# Patient Record
Sex: MALE | Race: WHITE | NOT HISPANIC OR LATINO
[De-identification: names, ages, dates, MRNs, and addresses within clinical notes are randomized per-mention and may not be internally consistent; named-entity substitution may affect disease eponyms.]

---

## 2018-09-20 ENCOUNTER — RECORD ABSTRACTING (OUTPATIENT)
Age: 58
End: 2018-09-20

## 2018-09-26 ENCOUNTER — NON-APPOINTMENT (OUTPATIENT)
Age: 58
End: 2018-09-26

## 2018-09-26 ENCOUNTER — APPOINTMENT (OUTPATIENT)
Dept: CARDIOLOGY | Facility: CLINIC | Age: 58
End: 2018-09-26
Payer: COMMERCIAL

## 2018-09-26 VITALS
HEIGHT: 74 IN | SYSTOLIC BLOOD PRESSURE: 120 MMHG | HEART RATE: 56 BPM | DIASTOLIC BLOOD PRESSURE: 72 MMHG | OXYGEN SATURATION: 99 % | BODY MASS INDEX: 21.3 KG/M2 | WEIGHT: 166 LBS

## 2018-09-26 DIAGNOSIS — F19.90 OTHER PSYCHOACTIVE SUBSTANCE USE, UNSPECIFIED, UNCOMPLICATED: ICD-10-CM

## 2018-09-26 DIAGNOSIS — Z87.891 PERSONAL HISTORY OF NICOTINE DEPENDENCE: ICD-10-CM

## 2018-09-26 DIAGNOSIS — Z86.79 PERSONAL HISTORY OF OTHER DISEASES OF THE CIRCULATORY SYSTEM: ICD-10-CM

## 2018-09-26 PROCEDURE — 99204 OFFICE O/P NEW MOD 45 MIN: CPT

## 2018-09-26 PROCEDURE — 93000 ELECTROCARDIOGRAM COMPLETE: CPT

## 2018-09-26 RX ORDER — LISINOPRIL 40 MG/1
40 TABLET ORAL DAILY
Refills: 0 | Status: ACTIVE | COMMUNITY

## 2018-10-08 ENCOUNTER — MEDICATION RENEWAL (OUTPATIENT)
Age: 58
End: 2018-10-08

## 2018-10-24 ENCOUNTER — APPOINTMENT (OUTPATIENT)
Dept: CARDIOLOGY | Facility: CLINIC | Age: 58
End: 2018-10-24
Payer: COMMERCIAL

## 2018-10-24 PROCEDURE — 93306 TTE W/DOPPLER COMPLETE: CPT

## 2018-10-24 PROCEDURE — 93880 EXTRACRANIAL BILAT STUDY: CPT

## 2018-10-31 ENCOUNTER — APPOINTMENT (OUTPATIENT)
Dept: CARDIOLOGY | Facility: CLINIC | Age: 58
End: 2018-10-31
Payer: COMMERCIAL

## 2018-10-31 VITALS
HEIGHT: 74 IN | BODY MASS INDEX: 21.82 KG/M2 | WEIGHT: 170 LBS | DIASTOLIC BLOOD PRESSURE: 88 MMHG | SYSTOLIC BLOOD PRESSURE: 158 MMHG

## 2018-10-31 PROCEDURE — 99214 OFFICE O/P EST MOD 30 MIN: CPT

## 2018-10-31 RX ORDER — CLONIDINE HYDROCHLORIDE 0.2 MG/1
0.2 TABLET ORAL DAILY
Refills: 0 | Status: DISCONTINUED | COMMUNITY
End: 2018-10-31

## 2018-11-30 ENCOUNTER — RX RENEWAL (OUTPATIENT)
Age: 58
End: 2018-11-30

## 2018-12-04 ENCOUNTER — APPOINTMENT (OUTPATIENT)
Dept: CARDIOLOGY | Facility: CLINIC | Age: 58
End: 2018-12-04
Payer: COMMERCIAL

## 2018-12-04 DIAGNOSIS — Z82.49 FAMILY HISTORY OF ISCHEMIC HEART DISEASE AND OTHER DISEASES OF THE CIRCULATORY SYSTEM: ICD-10-CM

## 2018-12-04 PROCEDURE — A9502: CPT

## 2018-12-04 PROCEDURE — 93015 CV STRESS TEST SUPVJ I&R: CPT

## 2018-12-04 PROCEDURE — 78452 HT MUSCLE IMAGE SPECT MULT: CPT

## 2018-12-05 ENCOUNTER — APPOINTMENT (OUTPATIENT)
Dept: CARDIOLOGY | Facility: CLINIC | Age: 58
End: 2018-12-05
Payer: COMMERCIAL

## 2018-12-05 VITALS
DIASTOLIC BLOOD PRESSURE: 78 MMHG | HEART RATE: 62 BPM | WEIGHT: 170 LBS | HEIGHT: 74 IN | BODY MASS INDEX: 21.82 KG/M2 | SYSTOLIC BLOOD PRESSURE: 128 MMHG

## 2018-12-05 PROCEDURE — 99214 OFFICE O/P EST MOD 30 MIN: CPT

## 2018-12-05 RX ORDER — PROPRANOLOL HYDROCHLORIDE 40 MG/1
40 TABLET ORAL TWICE DAILY
Refills: 0 | Status: DISCONTINUED | COMMUNITY
End: 2018-12-05

## 2018-12-05 RX ORDER — CLONIDINE HYDROCHLORIDE 0.1 MG/1
0.1 TABLET ORAL DAILY
Qty: 30 | Refills: 0 | Status: DISCONTINUED | COMMUNITY
Start: 2018-10-31 | End: 2018-12-05

## 2018-12-05 NOTE — REASON FOR VISIT
[Follow-Up - Clinic] : a clinic follow-up of [Abnormal ECG] : an abnormal ECG [Abnormal Test Result] : an abnormal test result [Hyperlipidemia] : hyperlipidemia [Hypertension] : hypertension [Medication Management] : Medication management [Mitral Regurgitation] : mitral regurgitation

## 2018-12-09 NOTE — PHYSICAL EXAM
[General Appearance - Well Developed] : well developed [Normal Appearance] : normal appearance [Well Groomed] : well groomed [General Appearance - Well Nourished] : well nourished [No Deformities] : no deformities [General Appearance - In No Acute Distress] : no acute distress [Normal Conjunctiva] : the conjunctiva exhibited no abnormalities [Eyelids - No Xanthelasma] : the eyelids demonstrated no xanthelasmas [Normal Oral Mucosa] : normal oral mucosa [No Oral Pallor] : no oral pallor [No Oral Cyanosis] : no oral cyanosis [Normal Jugular Venous A Waves Present] : normal jugular venous A waves present [Normal Jugular Venous V Waves Present] : normal jugular venous V waves present [No Jugular Venous Alvarez A Waves] : no jugular venous alvarez A waves [Respiration, Rhythm And Depth] : normal respiratory rhythm and effort [Exaggerated Use Of Accessory Muscles For Inspiration] : no accessory muscle use [Auscultation Breath Sounds / Voice Sounds] : lungs were clear to auscultation bilaterally [Heart Rate And Rhythm] : heart rate and rhythm were normal [Heart Sounds] : normal S1 and S2 [Murmurs] : no murmurs present [Abdomen Soft] : soft [Abdomen Tenderness] : non-tender [Abdomen Mass (___ Cm)] : no abdominal mass palpated [Abnormal Walk] : normal gait [Gait - Sufficient For Exercise Testing] : the gait was sufficient for exercise testing [Nail Clubbing] : no clubbing of the fingernails [Cyanosis, Localized] : no localized cyanosis [Petechial Hemorrhages (___cm)] : no petechial hemorrhages [Skin Color & Pigmentation] : normal skin color and pigmentation [] : no rash [No Venous Stasis] : no venous stasis [Skin Lesions] : no skin lesions [No Skin Ulcers] : no skin ulcer [No Xanthoma] : no  xanthoma was observed [Oriented To Time, Place, And Person] : oriented to person, place, and time [Affect] : the affect was normal [Mood] : the mood was normal [No Anxiety] : not feeling anxious [FreeTextEntry1] : decr upstroke

## 2018-12-09 NOTE — HISTORY OF PRESENT ILLNESS
[FreeTextEntry1] : JAMES FENG  is a 58 year old  M\par \par  from [place]. \par \par Long-standing history of hypertension. Reactive component. Hypertension for years. Feels his blood pressure go up in the doctor's office and then feels weak afterwards.\par Past history of heavy tobacco use. Quit several weeks ago. \par Recently told of elevated cholesterol. \par Also scheduled to see vascular. He intermittently reports discoloration of his feet\par \par At primary physician's office, noted to have abnormal normal EKG. \par EKG demonstrates sinus bradycardia with anteroseptal MI of indeterminate age. \par \par There is no prior history of a clinical myocardial infarction, coronary revascularization. \par There is no history of symptomatic congestive heart failure rheumatic heart disease or valvular disease.\par There is no history of symptomatic arrhythmias including atrial fibrillation.\par \par There is no exertional chest pain, pressure or discomfort. \par There is no significant dyspnea on exertion or orthopnea. \par There are no symptomatic palpitations, lightheadedness, dizziness or syncope.\par \par In the office. There are multiple elevated blood pressure readings. Home blood pressure monitor is accurate. Home readings have been reviewed and home readings are low. \par \par Blood work, September 2018. Hemoglobin 14.2, potassium 4.3, creatinine 1.1, .\par Carotid Mild plaque. No stenosis. \par Echocardiogram. Mild mitral regurgitation, normal left ventricular function segmental wall motion abnl\par Nuclear stress test. Exercised 3 minutes and 30 seconds. Fixed defect with concomitant attenuation. No ischemia.\par

## 2018-12-09 NOTE — ASSESSMENT
[FreeTextEntry1] : Abnormal EKG\par Abnormal echo, segmental wall motion abnormality.\par Stress test has been reviewed. No significant ischemic heart disease. \par No symptoms. \par Mild valvular disease. \par Atherosclerosis. \par Hyperlipidemia. \par Continue ASA and statin rx \par Smoking cessation\par \par Reactive HTN\par Discussed the diagnosis, natural history and pathophysiology of HTN.\par Discussed importance of long term BP control to reduce CV risk\par Reviewed recent guidelines and BP goals\par Echocardiogram without hypertensive heart disease\par Discussed indications for pharmacotherapy. \par Blood pressure within normal limits on today's examination\par Now off propranolol and clonidine. \par May be able to further reduce antihypertensives.\par Monitor blood pressure at home. \par \par Instructed to call if adverse effects\par Outside blood work and vascular evaluation has been requested.\par Adjunctive dietary measures: regular aerobic exercise, low-sodium diet, limit NSAIDs. \par Followup will be scheduled in 6 months.\par

## 2018-12-10 ENCOUNTER — RX RENEWAL (OUTPATIENT)
Age: 58
End: 2018-12-10

## 2018-12-10 RX ORDER — ASPIRIN 81 MG/1
81 TABLET, CHEWABLE ORAL
Qty: 90 | Refills: 3 | Status: ACTIVE | COMMUNITY
Start: 2018-12-10 | End: 1900-01-01

## 2019-06-05 ENCOUNTER — APPOINTMENT (OUTPATIENT)
Dept: CARDIOLOGY | Facility: CLINIC | Age: 59
End: 2019-06-05

## 2019-07-24 ENCOUNTER — APPOINTMENT (OUTPATIENT)
Dept: CARDIOLOGY | Facility: CLINIC | Age: 59
End: 2019-07-24
Payer: COMMERCIAL

## 2019-07-24 VITALS
HEIGHT: 74 IN | WEIGHT: 180 LBS | DIASTOLIC BLOOD PRESSURE: 68 MMHG | SYSTOLIC BLOOD PRESSURE: 128 MMHG | HEART RATE: 60 BPM | BODY MASS INDEX: 23.1 KG/M2

## 2019-07-24 DIAGNOSIS — Z00.00 ENCOUNTER FOR GENERAL ADULT MEDICAL EXAMINATION W/OUT ABNORMAL FINDINGS: ICD-10-CM

## 2019-07-24 PROCEDURE — 99214 OFFICE O/P EST MOD 30 MIN: CPT

## 2019-07-24 RX ORDER — KRILL/OM-3/DHA/EPA/PHOSPHO/AST 1000-230MG
81 CAPSULE ORAL DAILY
Qty: 90 | Refills: 0 | Status: DISCONTINUED | COMMUNITY
Start: 2018-09-26 | End: 2019-07-24

## 2019-07-24 NOTE — REASON FOR VISIT
[Follow-Up - Clinic] : a clinic follow-up of [Abnormal ECG] : an abnormal ECG [Hypertension] : hypertension [Hyperlipidemia] : hyperlipidemia [Abnormal Test Result] : an abnormal test result [Medication Management] : Medication management [Mitral Regurgitation] : mitral regurgitation

## 2019-07-25 NOTE — HISTORY OF PRESENT ILLNESS
[FreeTextEntry1] : JAMES FENG  is a 58 year old  M\par \par  from [place]. \par \par Long-standing history of hypertension. Reactive component. Hypertension for years. Feels his blood pressure go up in the doctor's office and then feels weak afterwards.\par Past history of heavy tobacco use. Quit recently \par Recently told of elevated cholesterol. \par Also scheduled to see vascular. He intermittently reports discoloration of his feet\par \par At primary physician's office, noted to have abnormal normal EKG. \par EKG demonstrates sinus bradycardia with anteroseptal MI of indeterminate age. \par \par There is no prior history of a clinical myocardial infarction, coronary revascularization. \par There is no history of symptomatic congestive heart failure rheumatic heart disease or valvular disease.\par There is no history of symptomatic arrhythmias including atrial fibrillation.\par \par There is no exertional chest pain, pressure or discomfort. \par There is no significant dyspnea on exertion or orthopnea. \par There are no symptomatic palpitations, lightheadedness, dizziness or syncope.\par \par In the office. There are multiple elevated blood pressure readings. Home blood pressure monitor is accurate. Home readings have been reviewed and home readings are low. \par \par Quit tobacco use\par Never saw vascular \par \par Intermittent bluish discoloration of his leg with swelling.\par Blood work, September 2018. Hemoglobin 14.2, potassium 4.3, creatinine 1.1, .\par Carotid Mild plaque. No stenosis. \par Echocardiogram. Mild mitral regurgitation, normal left ventricular function segmental wall motion abnl\par Nuclear stress test. Exercised 3 minutes and 30 seconds. Fixed defect with concomitant attenuation. No ischemia.\par

## 2019-07-25 NOTE — PHYSICAL EXAM
[Normal Appearance] : normal appearance [Well Groomed] : well groomed [General Appearance - Well Developed] : well developed [General Appearance - Well Nourished] : well nourished [No Deformities] : no deformities [General Appearance - In No Acute Distress] : no acute distress [Eyelids - No Xanthelasma] : the eyelids demonstrated no xanthelasmas [Normal Oral Mucosa] : normal oral mucosa [Normal Conjunctiva] : the conjunctiva exhibited no abnormalities [No Oral Cyanosis] : no oral cyanosis [No Oral Pallor] : no oral pallor [No Jugular Venous Alvarez A Waves] : no jugular venous alvarez A waves [Normal Jugular Venous V Waves Present] : normal jugular venous V waves present [Normal Jugular Venous A Waves Present] : normal jugular venous A waves present [Respiration, Rhythm And Depth] : normal respiratory rhythm and effort [Heart Rate And Rhythm] : heart rate and rhythm were normal [Auscultation Breath Sounds / Voice Sounds] : lungs were clear to auscultation bilaterally [Exaggerated Use Of Accessory Muscles For Inspiration] : no accessory muscle use [Murmurs] : no murmurs present [Heart Sounds] : normal S1 and S2 [Abdomen Soft] : soft [Abdomen Tenderness] : non-tender [Abnormal Walk] : normal gait [Gait - Sufficient For Exercise Testing] : the gait was sufficient for exercise testing [Abdomen Mass (___ Cm)] : no abdominal mass palpated [Petechial Hemorrhages (___cm)] : no petechial hemorrhages [Cyanosis, Localized] : no localized cyanosis [Nail Clubbing] : no clubbing of the fingernails [] : no ischemic changes [Skin Color & Pigmentation] : normal skin color and pigmentation [No Venous Stasis] : no venous stasis [No Skin Ulcers] : no skin ulcer [Skin Lesions] : no skin lesions [No Xanthoma] : no  xanthoma was observed [Affect] : the affect was normal [Oriented To Time, Place, And Person] : oriented to person, place, and time [No Anxiety] : not feeling anxious [Mood] : the mood was normal [FreeTextEntry1] : decr upstroke

## 2019-07-25 NOTE — ASSESSMENT
[FreeTextEntry1] : Abnormal EKG\par Abnormal echo, segmental wall motion abnormality.\par Stress test has been reviewed. No significant ischemic heart disease. \par No symptoms. \par Mild valvular disease. \par Atherosclerosis. \par Hyperlipidemia. \par \par Followup bloodwork has been requested\par Recent noninvasive testing has been reviewed \par Arrange vascular consultation \par Continue aspirin\par Continue statin\par Monitor blood pressure at home.\par Smoking cessation\par \par Reactive HTN\par Discussed the diagnosis, natural history and pathophysiology of HTN.\par Discussed importance of long term BP control to reduce CV risk\par Reviewed recent guidelines and BP goals\par Echocardiogram without hypertensive heart disease\par Discussed indications for pharmacotherapy. \par Now off propranolol and clonidine. \par \par Instructed to call if adverse effects\par Adjunctive dietary measures: regular aerobic exercise, low-sodium diet, limit NSAIDs. \par Followup will be scheduled in 6 months.\par

## 2020-01-06 ENCOUNTER — RECORD ABSTRACTING (OUTPATIENT)
Age: 60
End: 2020-01-06

## 2020-03-02 ENCOUNTER — NON-APPOINTMENT (OUTPATIENT)
Age: 60
End: 2020-03-02

## 2020-03-02 ENCOUNTER — APPOINTMENT (OUTPATIENT)
Dept: CARDIOLOGY | Facility: CLINIC | Age: 60
End: 2020-03-02
Payer: MEDICAID

## 2020-03-02 VITALS
OXYGEN SATURATION: 96 % | WEIGHT: 182 LBS | BODY MASS INDEX: 23.36 KG/M2 | HEIGHT: 74 IN | HEART RATE: 88 BPM | DIASTOLIC BLOOD PRESSURE: 72 MMHG | SYSTOLIC BLOOD PRESSURE: 110 MMHG

## 2020-03-02 PROCEDURE — 93000 ELECTROCARDIOGRAM COMPLETE: CPT

## 2020-03-02 PROCEDURE — 99214 OFFICE O/P EST MOD 30 MIN: CPT

## 2020-03-02 RX ORDER — ROSUVASTATIN CALCIUM 10 MG/1
10 TABLET, FILM COATED ORAL DAILY
Qty: 90 | Refills: 1 | Status: ACTIVE | COMMUNITY
Start: 2020-03-02 | End: 1900-01-01

## 2020-03-02 RX ORDER — ATORVASTATIN CALCIUM 20 MG/1
20 TABLET, FILM COATED ORAL
Refills: 0 | Status: DISCONTINUED | COMMUNITY
End: 2020-03-02

## 2020-03-06 NOTE — ASSESSMENT
[FreeTextEntry1] : \par Abnormal EKG\par Abnormal echo, segmental wall motion abnormality.\par Stress test has been reviewed. \par No symptoms. \par Mild valvular disease. \par Atherosclerosis. \par Hyperlipidemia. Lipitor intolerance.  \par \par Start Crestor.  Instructed to call if adverse effects.  \par Vascular follow-up.  \par Blood pressure now well controlled.  \par Invasive evaluation if any symptoms\par Followup bloodwork has been requested\par \par Continue aspirin\par Continue statin\par Monitor blood pressure at home.\par Smoking cessation\par \par Reactive HTN\par Discussed the diagnosis, natural history and pathophysiology of HTN.\par Discussed importance of long term BP control to reduce CV risk\par Reviewed recent guidelines and BP goals\par Echocardiogram without hypertensive heart disease\par Discussed indications for pharmacotherapy. \par Now off propranolol and clonidine. \par \par Instructed to call if adverse effects\par Adjunctive dietary measures: regular aerobic exercise, low-sodium diet, limit NSAIDs. \par

## 2020-03-06 NOTE — PHYSICAL EXAM
[General Appearance - Well Developed] : well developed [Normal Appearance] : normal appearance [Well Groomed] : well groomed [No Deformities] : no deformities [General Appearance - Well Nourished] : well nourished [General Appearance - In No Acute Distress] : no acute distress [Normal Conjunctiva] : the conjunctiva exhibited no abnormalities [Eyelids - No Xanthelasma] : the eyelids demonstrated no xanthelasmas [Normal Oral Mucosa] : normal oral mucosa [No Oral Pallor] : no oral pallor [No Oral Cyanosis] : no oral cyanosis [Normal Jugular Venous A Waves Present] : normal jugular venous A waves present [Normal Jugular Venous V Waves Present] : normal jugular venous V waves present [No Jugular Venous Alvarez A Waves] : no jugular venous alvarez A waves [Respiration, Rhythm And Depth] : normal respiratory rhythm and effort [Exaggerated Use Of Accessory Muscles For Inspiration] : no accessory muscle use [Heart Sounds] : normal S1 and S2 [Auscultation Breath Sounds / Voice Sounds] : lungs were clear to auscultation bilaterally [Heart Rate And Rhythm] : heart rate and rhythm were normal [Abdomen Soft] : soft [Murmurs] : no murmurs present [Abdomen Tenderness] : non-tender [Abdomen Mass (___ Cm)] : no abdominal mass palpated [Abnormal Walk] : normal gait [Gait - Sufficient For Exercise Testing] : the gait was sufficient for exercise testing [Nail Clubbing] : no clubbing of the fingernails [Cyanosis, Localized] : no localized cyanosis [Petechial Hemorrhages (___cm)] : no petechial hemorrhages [Skin Color & Pigmentation] : normal skin color and pigmentation [] : no rash [No Venous Stasis] : no venous stasis [Skin Lesions] : no skin lesions [No Skin Ulcers] : no skin ulcer [No Xanthoma] : no  xanthoma was observed [Oriented To Time, Place, And Person] : oriented to person, place, and time [Affect] : the affect was normal [Mood] : the mood was normal [No Anxiety] : not feeling anxious [FreeTextEntry1] : decr upstroke

## 2020-03-06 NOTE — HISTORY OF PRESENT ILLNESS
[FreeTextEntry1] : JAMES FENG  is a 59 year old  M\par  from [place]. \par \par Long-standing history of hypertension. Reactive component. Hypertension for years. Feels his blood pressure go up in the doctor's office and then feels weak afterwards.\par Past history of heavy tobacco use. Quit recently \par Recently told of elevated cholesterol. \par Also scheduled to see vascular. He intermittently reports discoloration of his feet\par \par At primary physician's office, noted to have abnormal EKG. \par EKG demonstrates sinus bradycardia with anteroseptal MI of indeterminate age. \par \par There is no prior history of a clinical myocardial infarction, coronary revascularization. \par There is no history of symptomatic congestive heart failure rheumatic heart disease or valvular disease.\par There is no history of symptomatic arrhythmias including atrial fibrillation.\par \par There is no exertional chest pain, pressure or discomfort. \par There is no significant dyspnea on exertion or orthopnea. \par There are no symptomatic palpitations, lightheadedness, dizziness or syncope.\par \par In the office. There are multiple elevated blood pressure readings. Home blood pressure monitor is accurate. Home readings have been reviewed and home readings are low. \par \par Quit tobacco use\par Never saw vascular \par Intermittent bluish discoloration of his leg with swelling.\par Went off Lipitor due to symptoms of GI upset.  \par \par Blood work February 2020 TSH total cholesterol 248, creatinine 1.3, hemoglobin 14.6, .  \par Blood work, September 2018. Hemoglobin 14.2, potassium 4.3, creatinine 1.1, .\par Carotid 10.18 Mild plaque. No stenosis. \par Echocardiogram 10.18. Mild mitral regurgitation, normal left ventricular function segmental wall motion abnl\par Nuclear stress test 12.18. Exercised 3 minutes and 30 seconds. Fixed defect with concomitant attenuation. No ischemia.\par

## 2020-08-11 ENCOUNTER — APPOINTMENT (OUTPATIENT)
Dept: CARDIOLOGY | Facility: CLINIC | Age: 60
End: 2020-08-11
Payer: MEDICAID

## 2020-08-11 VITALS
OXYGEN SATURATION: 98 % | WEIGHT: 182 LBS | HEART RATE: 72 BPM | SYSTOLIC BLOOD PRESSURE: 138 MMHG | DIASTOLIC BLOOD PRESSURE: 80 MMHG | HEIGHT: 74 IN | BODY MASS INDEX: 23.36 KG/M2 | TEMPERATURE: 97.3 F

## 2020-08-11 DIAGNOSIS — R93.1 ABNORMAL FINDINGS ON DIAGNOSTIC IMAGING OF HEART AND CORONARY CIRCULATION: ICD-10-CM

## 2020-08-11 PROCEDURE — 99214 OFFICE O/P EST MOD 30 MIN: CPT

## 2020-08-11 RX ORDER — CHLORTHALIDONE 25 MG/1
25 TABLET ORAL DAILY
Refills: 0 | Status: DISCONTINUED | COMMUNITY
End: 2020-08-11

## 2020-08-11 NOTE — HISTORY OF PRESENT ILLNESS
[FreeTextEntry1] : JAMES FENG  is a 59 year old  M\par  from [place]. \par \par Long-standing history of hypertension. Reactive component. Hypertension for years. Feels his blood pressure go up in the doctor's office and then feels weak afterwards.\par Past history of heavy tobacco use. Quit recently \par Recently told of elevated cholesterol. \par Also scheduled to see vascular. He intermittently reports discoloration of his feet\par \par At primary physician's office, noted to have abnormal EKG. \par EKG demonstrates sinus bradycardia with anteroseptal MI of indeterminate age. \par \par There is no prior history of a clinical myocardial infarction, coronary revascularization. \par There is no history of symptomatic congestive heart failure rheumatic heart disease or valvular disease.\par There is no history of symptomatic arrhythmias including atrial fibrillation.\par \par There is no exertional chest pain, pressure or discomfort. \par There is no significant dyspnea on exertion or orthopnea. \par There are no symptomatic palpitations, lightheadedness, dizziness or syncope.\par \par In the office. There are multiple elevated blood pressure readings. Home blood pressure monitor is inaccurate. \par Quit tobacco use\par Never saw vascular \par Intermittent bluish discoloration of his leg with swelling.\par Went off Lipitor due to symptoms of GI upset.  \par \par Blood work February 2020 TSH total cholesterol 248, creatinine 1.3, hemoglobin 14.6, .  \par Blood work, September 2018. Hemoglobin 14.2, potassium 4.3, creatinine 1.1, .\par Carotid 10.18 Mild plaque. No stenosis. \par Echocardiogram 10.18. Mild mitral regurgitation, normal left ventricular function segmental wall motion abnl\par Nuclear stress test 12.18. Exercised 3 minutes and 30 seconds. Fixed defect with concomitant attenuation. No ischemia.\par

## 2020-08-11 NOTE — REASON FOR VISIT
[Abnormal ECG] : an abnormal ECG [Follow-Up - Clinic] : a clinic follow-up of [Abnormal Test Result] : an abnormal test result [Hyperlipidemia] : hyperlipidemia [Hypertension] : hypertension [Medication Management] : Medication management [Mitral Regurgitation] : mitral regurgitation

## 2020-08-11 NOTE — PHYSICAL EXAM
[General Appearance - Well Developed] : well developed [Normal Appearance] : normal appearance [Well Groomed] : well groomed [General Appearance - Well Nourished] : well nourished [No Deformities] : no deformities [Normal Conjunctiva] : the conjunctiva exhibited no abnormalities [General Appearance - In No Acute Distress] : no acute distress [Eyelids - No Xanthelasma] : the eyelids demonstrated no xanthelasmas [Normal Oral Mucosa] : normal oral mucosa [No Oral Pallor] : no oral pallor [No Oral Cyanosis] : no oral cyanosis [Normal Jugular Venous A Waves Present] : normal jugular venous A waves present [Normal Jugular Venous V Waves Present] : normal jugular venous V waves present [No Jugular Venous Alvarez A Waves] : no jugular venous alvarez A waves [Exaggerated Use Of Accessory Muscles For Inspiration] : no accessory muscle use [Respiration, Rhythm And Depth] : normal respiratory rhythm and effort [Heart Rate And Rhythm] : heart rate and rhythm were normal [Heart Sounds] : normal S1 and S2 [Auscultation Breath Sounds / Voice Sounds] : lungs were clear to auscultation bilaterally [Abdomen Soft] : soft [Murmurs] : no murmurs present [Abdomen Tenderness] : non-tender [Abdomen Mass (___ Cm)] : no abdominal mass palpated [Gait - Sufficient For Exercise Testing] : the gait was sufficient for exercise testing [Abnormal Walk] : normal gait [Cyanosis, Localized] : no localized cyanosis [Nail Clubbing] : no clubbing of the fingernails [Petechial Hemorrhages (___cm)] : no petechial hemorrhages [Skin Color & Pigmentation] : normal skin color and pigmentation [No Venous Stasis] : no venous stasis [] : no rash [No Skin Ulcers] : no skin ulcer [Skin Lesions] : no skin lesions [No Xanthoma] : no  xanthoma was observed [Oriented To Time, Place, And Person] : oriented to person, place, and time [Affect] : the affect was normal [Mood] : the mood was normal [No Anxiety] : not feeling anxious [FreeTextEntry1] : decr upstroke

## 2020-08-11 NOTE — ASSESSMENT
[FreeTextEntry1] : \par Abnormal EKG\par Abnormal echo, segmental wall motion abnormality.\par Stress test has been reviewed. \par No symptoms. \par Mild valvular disease. \par Atherosclerosis. \par Hyperlipidemia. Lipitor intolerance.  \par \par \par Reactive HTN\par Discussed the diagnosis, natural history and pathophysiology of HTN.\par Discussed importance of long term BP control to reduce CV risk\par Reviewed recent guidelines and BP goals\par Recently had a couple of episodes of lightheadedness.  His blood pressure was low.  The dose of chlorthalidone was reduced to half by PMD.  He has been feeling better.  Today's blood pressure reading is fine.  He does have white coat syndrome and is usually slightly elevated in doctor's office.  Continue blood pressure monitoring.  Increase hydration.  His home blood pressure machine is inaccurate.  He will get a new blood pressure cuff and continue monitoring blood pressure at home.

## 2020-09-14 ENCOUNTER — APPOINTMENT (OUTPATIENT)
Dept: CARDIOLOGY | Facility: CLINIC | Age: 60
End: 2020-09-14
Payer: MEDICAID

## 2020-09-14 VITALS
DIASTOLIC BLOOD PRESSURE: 80 MMHG | SYSTOLIC BLOOD PRESSURE: 140 MMHG | HEART RATE: 67 BPM | BODY MASS INDEX: 23.61 KG/M2 | OXYGEN SATURATION: 98 % | HEIGHT: 74 IN | WEIGHT: 184 LBS

## 2020-09-14 DIAGNOSIS — R60.0 LOCALIZED EDEMA: ICD-10-CM

## 2020-09-14 DIAGNOSIS — R42 DIZZINESS AND GIDDINESS: ICD-10-CM

## 2020-09-14 PROCEDURE — 99214 OFFICE O/P EST MOD 30 MIN: CPT

## 2020-09-14 PROCEDURE — 93880 EXTRACRANIAL BILAT STUDY: CPT

## 2020-09-14 NOTE — REASON FOR VISIT
[Follow-Up - Clinic] : a clinic follow-up of [Abnormal ECG] : an abnormal ECG [Abnormal Test Result] : an abnormal test result [Hypertension] : hypertension [Hyperlipidemia] : hyperlipidemia [Medication Management] : Medication management [Mitral Regurgitation] : mitral regurgitation

## 2020-09-24 ENCOUNTER — APPOINTMENT (OUTPATIENT)
Dept: CARDIOLOGY | Facility: CLINIC | Age: 60
End: 2020-09-24
Payer: MEDICAID

## 2020-09-24 PROCEDURE — 93880 EXTRACRANIAL BILAT STUDY: CPT

## 2020-09-24 PROCEDURE — 93306 TTE W/DOPPLER COMPLETE: CPT

## 2020-09-26 NOTE — HISTORY OF PRESENT ILLNESS
[FreeTextEntry1] : JAMES FENG  is a 59 year old  M\par  from [place]. \par \par Long-standing history of hypertension. Reactive component. Hypertension for years. Feels his blood pressure go up in the doctor's office and then feels weak afterwards.\par Past history of heavy tobacco use. Quit recently \par Recently told of elevated cholesterol. \par Also scheduled to see vascular. He intermittently reports discoloration of his feet\par \par At primary physician's office, noted to have abnormal EKG. \par EKG demonstrates sinus bradycardia with anteroseptal MI of indeterminate age. \par \par There is no prior history of a clinical myocardial infarction, coronary revascularization. \par There is no history of symptomatic congestive heart failure rheumatic heart disease or valvular disease.\par There is no history of symptomatic arrhythmias including atrial fibrillation.\par \par There is no exertional chest pain, pressure or discomfort. \par There is no significant dyspnea on exertion or orthopnea. \par There are no symptomatic palpitations, lightheadedness, dizziness or syncope.\par \par In the office. There are multiple elevated blood pressure readings. Home blood pressure monitor is accurate. Home readings have been reviewed and home readings are low. \par \par Quit tobacco use\par Intermittent bluish discoloration of his leg with swelling.\par Went off Lipitor due to symptoms of GI upset.  \par \par Had dizziness with low blood pressure readings.  Dose of chlorthalidone was reduced.  \par He has edema related to venous insufficiency. \par Following with vascular.  \par On today's examination his blood pressure is well controlled.  His home monitor is accurate. \par On repeat measurement his blood pressure is 126/80. \par \par July 2020 hemoglobin 14.0, creatinine 1.2,  \par Blood work February 2020 TSH total cholesterol 248, creatinine 1.3, hemoglobin 14.6, .  \par Carotid 10.18 Mild plaque. No stenosis. \par Echocardiogram 10.18. Mild mitral regurgitation, normal left ventricular function segmental wall motion abnl\par Nuclear stress test 12.18. Exercised 3 minutes and 30 seconds. Fixed defect with concomitant attenuation. No ischemia.\par

## 2020-09-26 NOTE — ASSESSMENT
[FreeTextEntry1] : Abnormal EKG\par Abnormal echo, segmental wall motion abnormality.\par Stress test has been reviewed. \par \par Edema.  Dizziness.  \par Follow-up carotid Doppler and echocardiogram.\par \par \par Mild valvular disease. \par Atherosclerosis. \par Hyperlipidemia. Lipitor intolerance.  \par \par Continue Crestor.  Instructed to call if adverse effects.  \par Vascular follow-up.  \par Blood pressure now well controlled. \par If recurrent symptoms instructed to take a seated position and increase hydration\par Will continue low-dose of chlorthalidone.   \par \par Continue aspirin\par Continue statin\par Monitor blood pressure at home.\par Smoking cessation\par \par Reactive HTN\par Discussed the diagnosis, natural history and pathophysiology of HTN.\par Discussed importance of long term BP control to reduce CV risk\par Reviewed recent guidelines and BP goals\par Echocardiogram without hypertensive heart disease\par Discussed indications for pharmacotherapy. \par Now off propranolol and clonidine. \par \par Instructed to call if adverse effects\par Adjunctive dietary measures: regular aerobic exercise, low-sodium diet, limit NSAIDs. \par

## 2020-09-26 NOTE — PHYSICAL EXAM
[Normal Appearance] : normal appearance [General Appearance - Well Developed] : well developed [Well Groomed] : well groomed [General Appearance - Well Nourished] : well nourished [General Appearance - In No Acute Distress] : no acute distress [No Deformities] : no deformities [Normal Conjunctiva] : the conjunctiva exhibited no abnormalities [Eyelids - No Xanthelasma] : the eyelids demonstrated no xanthelasmas [Normal Oral Mucosa] : normal oral mucosa [No Oral Pallor] : no oral pallor [No Oral Cyanosis] : no oral cyanosis [Normal Jugular Venous A Waves Present] : normal jugular venous A waves present [Normal Jugular Venous V Waves Present] : normal jugular venous V waves present [No Jugular Venous Alvarez A Waves] : no jugular venous alvarez A waves [Respiration, Rhythm And Depth] : normal respiratory rhythm and effort [Exaggerated Use Of Accessory Muscles For Inspiration] : no accessory muscle use [Auscultation Breath Sounds / Voice Sounds] : lungs were clear to auscultation bilaterally [Heart Rate And Rhythm] : heart rate and rhythm were normal [Heart Sounds] : normal S1 and S2 [Murmurs] : no murmurs present [Abdomen Soft] : soft [Abdomen Tenderness] : non-tender [Abdomen Mass (___ Cm)] : no abdominal mass palpated [Gait - Sufficient For Exercise Testing] : the gait was sufficient for exercise testing [Abnormal Walk] : normal gait [Cyanosis, Localized] : no localized cyanosis [Nail Clubbing] : no clubbing of the fingernails [Petechial Hemorrhages (___cm)] : no petechial hemorrhages [Skin Color & Pigmentation] : normal skin color and pigmentation [] : no rash [No Venous Stasis] : no venous stasis [Skin Lesions] : no skin lesions [No Skin Ulcers] : no skin ulcer [No Xanthoma] : no  xanthoma was observed [Oriented To Time, Place, And Person] : oriented to person, place, and time [Affect] : the affect was normal [Mood] : the mood was normal [No Anxiety] : not feeling anxious [FreeTextEntry1] : decr upstroke

## 2020-10-27 ENCOUNTER — APPOINTMENT (OUTPATIENT)
Dept: CARDIOLOGY | Facility: CLINIC | Age: 60
End: 2020-10-27
Payer: MEDICAID

## 2020-10-27 VITALS
TEMPERATURE: 97.5 F | SYSTOLIC BLOOD PRESSURE: 142 MMHG | HEART RATE: 76 BPM | DIASTOLIC BLOOD PRESSURE: 92 MMHG | OXYGEN SATURATION: 98 % | BODY MASS INDEX: 23.75 KG/M2 | WEIGHT: 185 LBS

## 2020-10-27 DIAGNOSIS — R94.31 ABNORMAL ELECTROCARDIOGRAM [ECG] [EKG]: ICD-10-CM

## 2020-10-27 DIAGNOSIS — I34.0 NONRHEUMATIC MITRAL (VALVE) INSUFFICIENCY: ICD-10-CM

## 2020-10-27 DIAGNOSIS — E78.5 HYPERLIPIDEMIA, UNSPECIFIED: ICD-10-CM

## 2020-10-27 DIAGNOSIS — I65.29 OCCLUSION AND STENOSIS OF UNSPECIFIED CAROTID ARTERY: ICD-10-CM

## 2020-10-27 DIAGNOSIS — I10 ESSENTIAL (PRIMARY) HYPERTENSION: ICD-10-CM

## 2020-10-27 PROCEDURE — 99072 ADDL SUPL MATRL&STAF TM PHE: CPT

## 2020-10-27 PROCEDURE — 99214 OFFICE O/P EST MOD 30 MIN: CPT

## 2020-10-27 NOTE — ADDENDUM
[FreeTextEntry1] : Please note the patient was reviewed with ZURI Marti.\par I was physically present during the service of the patient\par I was directly involved in the management plan and recommendations of care provided to the patient. \par I personally reviewed the history and physical exam and examination as documented by the PA above.\par 10/27/2020\par

## 2020-10-27 NOTE — ASSESSMENT
[FreeTextEntry1] : JAMES FENG  is a 59 year M  who presents today Oct 27, 2020 with the above history and the following active issues. \par \par Prior history of edema which has resolved. \par \par Echocardiogram reviewed and demonstrates mild valvular disease. \par Atherosclerosis. \par \par Hyperlipidemia. Lipitor intolerance.  Continue Crestor.  Instructed to call if adverse effects.  \par Vascular follow-up.  Lifestyle and risk factor modification\par Continue aspirin\par Continue statin\par Monitor blood pressure at home.\par \par Reactive HTN\par Discussed the diagnosis, natural history and pathophysiology of HTN.\par Discussed importance of long term BP control to reduce CV risk\par Reviewed recent guidelines and BP goals\par Echocardiogram without hypertensive heart disease\par Discussed indications for pharmacotherapy. \par Now off propranolol and clonidine. \par \par Instructed to call if adverse effects\par Adjunctive dietary measures: regular aerobic exercise, low-sodium diet, limit NSAIDs. \par \par Red flag symptoms which would warrant sooner emergent evaluation reviewed with the patient. \par Questions and concerns were addressed and answered. \par \par Sincerely,\par \par Suzan Marti PA-C\par Patients history, testing and plan reviewed with supervising MD: Dr. Pantera Barajas \par

## 2020-10-27 NOTE — HISTORY OF PRESENT ILLNESS
[FreeTextEntry1] : JAMES FENG  is a 59 year M  who presents today Oct 27, 2020 to review echo and carotid US. Abd US was denied by insurance. Overall he has been doing very well. Asymptomatic from arrhythmia and cardiovascular standpoint. Medications remain unchanged. Exerting himself on a regular basis with no new exertional complaints. \par  from [place]. \par \par Long-standing history of hypertension. Reactive component. Hypertension for years. Feels his blood pressure go up in the doctor's office and then feels weak afterwards.\par Past history of heavy tobacco use. Quit recently \par \par At primary physician's office, noted to have abnormal EKG. \par EKG demonstrates sinus bradycardia with anteroseptal MI of indeterminate age. \par \par There is no prior history of a clinical myocardial infarction, coronary revascularization. \par There is no history of symptomatic congestive heart failure rheumatic heart disease or valvular disease.\par There is no history of symptomatic arrhythmias including atrial fibrillation.\par \par There is no exertional chest pain, pressure or discomfort. \par There is no significant dyspnea on exertion or orthopnea. \par There are no symptomatic palpitations, lightheadedness, dizziness or syncope.\par \par In the office. There are multiple elevated blood pressure readings. Home blood pressure monitor is accurate. \par \par \par Echo 9/24/2020 EF 60-65%, mild MR, min AR, no significant changes 2018\par \par Carotid US 09/24/2020 mild non-obstructive disease\par \par July 2020 hemoglobin 14.0, creatinine 1.2,  \par \par Blood work February 2020 TSH total cholesterol 248, creatinine 1.3, hemoglobin 14.6, .  \par Carotid 10.18 Mild plaque. No stenosis. \par \par Echocardiogram 10.18. Mild mitral regurgitation, normal left ventricular function segmental wall motion abnl\par Nuclear stress test 12.18. Exercised 3 minutes and 30 seconds. Fixed defect with concomitant attenuation. No ischemia.\par

## 2021-04-19 ENCOUNTER — APPOINTMENT (OUTPATIENT)
Dept: CARDIOLOGY | Facility: CLINIC | Age: 61
End: 2021-04-19

## 2021-04-19 NOTE — HISTORY OF PRESENT ILLNESS
[FreeTextEntry1] : JAMES FENG  is a 59 year M  who presents today Oct 27, 2020 to review echo and carotid US. Abd US was denied by insurance. Overall he has been doing very well. Asymptomatic from arrhythmia and cardiovascular standpoint. Medications remain unchanged. Exerting himself on a regular basis with no new exertional complaints. \par  from [place]. \par \par Long-standing history of hypertension. Reactive component. Hypertension for years. Feels his blood pressure go up in the doctor's office and then feels weak afterwards.\par Past history of heavy tobacco use. Quit recently \par \par At primary physician's office, noted to have abnormal EKG. \par EKG demonstrates sinus bradycardia with anteroseptal MI of indeterminate age. \par \par There is no prior history of a clinical myocardial infarction, coronary revascularization. \par There is no history of symptomatic congestive heart failure rheumatic heart disease or valvular disease.\par There is no history of symptomatic arrhythmias including atrial fibrillation.\par \par There is no exertional chest pain, pressure or discomfort. \par There is no significant dyspnea on exertion or orthopnea. \par There are no symptomatic palpitations, lightheadedness, dizziness or syncope.\par \par In the office. There are multiple elevated blood pressure readings. Home blood pressure monitor is accurate. \par \par \par Echo 9/24/2020 EF 60-65%, mild MR, min AR, no significant changes 2018\par \par Carotid US 09/24/2020 mild non-obstructive disease\par \par July 2020 hemoglobin 14.0, creatinine 1.2,  \par \par Blood work February 2020 TSH total cholesterol 248, creatinine 1.3, hemoglobin 14.6, .  \par Carotid 10.18 Mild plaque. No stenosis. \par \par Echocardiogram 10.18. Mild mitral regurgitation, normal left ventricular function segmental wall motion abnl\par Nuclear stress test 12.18. Exercised 3 minutes and 30 seconds. Fixed defect with concomitant attenuation. No ischemia.\par \par JAMES FENG  is a 59 year M  who presents today Oct 27, 2020 with the above history and the following active issues. \par \par Prior history of edema which has resolved. \par \par Echocardiogram reviewed and demonstrates mild valvular disease. \par Atherosclerosis. \par \par Hyperlipidemia. Lipitor intolerance.  Continue Crestor.  Instructed to call if adverse effects.  \par Vascular follow-up.  Lifestyle and risk factor modification\par Continue aspirin\par Continue statin\par Monitor blood pressure at home.\par \par Reactive HTN\par Discussed the diagnosis, natural history and pathophysiology of HTN.\par Discussed importance of long term BP control to reduce CV risk\par Reviewed recent guidelines and BP goals\par Echocardiogram without hypertensive heart disease\par Discussed indications for pharmacotherapy. \par Now off propranolol and clonidine. \par \par Instructed to call if adverse effects\par Adjunctive dietary measures: regular aerobic exercise, low-sodium diet, limit NSAIDs. \par \par Red flag symptoms which would warrant sooner emergent evaluation reviewed with the patient. \par Questions and concerns were addressed and answered. \par \par Sincerely,\par \par Suzan Marti PA-C\par Patients history, testing and plan reviewed with supervising MD: Dr. Pantera Barajas \par \par

## 2021-07-08 ENCOUNTER — NON-APPOINTMENT (OUTPATIENT)
Age: 61
End: 2021-07-08

## 2021-07-08 DIAGNOSIS — R74.8 ABNORMAL LEVELS OF OTHER SERUM ENZYMES: ICD-10-CM

## 2021-10-06 PROBLEM — I10 ESSENTIAL HYPERTENSION: Status: ACTIVE | Noted: 2018-09-26

## 2025-02-12 ENCOUNTER — APPOINTMENT (OUTPATIENT)
Dept: CARDIOLOGY | Facility: CLINIC | Age: 65
End: 2025-02-12

## 2025-02-24 ENCOUNTER — APPOINTMENT (OUTPATIENT)
Dept: CARDIOLOGY | Facility: CLINIC | Age: 65
End: 2025-02-24
Payer: MEDICAID

## 2025-02-24 ENCOUNTER — NON-APPOINTMENT (OUTPATIENT)
Age: 65
End: 2025-02-24

## 2025-02-24 VITALS
HEART RATE: 59 BPM | SYSTOLIC BLOOD PRESSURE: 126 MMHG | WEIGHT: 174 LBS | DIASTOLIC BLOOD PRESSURE: 76 MMHG | BODY MASS INDEX: 22.33 KG/M2 | HEIGHT: 74 IN | OXYGEN SATURATION: 97 %

## 2025-02-24 DIAGNOSIS — R42 DIZZINESS AND GIDDINESS: ICD-10-CM

## 2025-02-24 DIAGNOSIS — E78.5 HYPERLIPIDEMIA, UNSPECIFIED: ICD-10-CM

## 2025-02-24 DIAGNOSIS — I10 ESSENTIAL (PRIMARY) HYPERTENSION: ICD-10-CM

## 2025-02-24 PROCEDURE — 93000 ELECTROCARDIOGRAM COMPLETE: CPT

## 2025-02-24 PROCEDURE — 99204 OFFICE O/P NEW MOD 45 MIN: CPT

## 2025-03-14 ENCOUNTER — APPOINTMENT (OUTPATIENT)
Dept: CARDIOLOGY | Facility: CLINIC | Age: 65
End: 2025-03-14
Payer: MEDICAID

## 2025-03-14 PROCEDURE — 93306 TTE W/DOPPLER COMPLETE: CPT

## 2025-03-14 PROCEDURE — 93880 EXTRACRANIAL BILAT STUDY: CPT

## 2025-04-29 ENCOUNTER — APPOINTMENT (OUTPATIENT)
Dept: CARDIOLOGY | Facility: CLINIC | Age: 65
End: 2025-04-29
Payer: MEDICAID

## 2025-04-29 PROCEDURE — A9502: CPT | Mod: JZ

## 2025-04-29 PROCEDURE — 93015 CV STRESS TEST SUPVJ I&R: CPT

## 2025-04-29 PROCEDURE — 78452 HT MUSCLE IMAGE SPECT MULT: CPT

## 2025-04-29 PROCEDURE — 93923 UPR/LXTR ART STDY 3+ LVLS: CPT

## 2025-05-05 ENCOUNTER — APPOINTMENT (OUTPATIENT)
Dept: CARDIOLOGY | Facility: CLINIC | Age: 65
End: 2025-05-05
Payer: MEDICAID

## 2025-05-05 ENCOUNTER — NON-APPOINTMENT (OUTPATIENT)
Age: 65
End: 2025-05-05

## 2025-05-05 VITALS
HEIGHT: 74 IN | OXYGEN SATURATION: 98 % | HEART RATE: 55 BPM | BODY MASS INDEX: 22.07 KG/M2 | SYSTOLIC BLOOD PRESSURE: 118 MMHG | DIASTOLIC BLOOD PRESSURE: 76 MMHG | WEIGHT: 172 LBS

## 2025-05-05 DIAGNOSIS — I34.0 NONRHEUMATIC MITRAL (VALVE) INSUFFICIENCY: ICD-10-CM

## 2025-05-05 DIAGNOSIS — E78.5 HYPERLIPIDEMIA, UNSPECIFIED: ICD-10-CM

## 2025-05-05 DIAGNOSIS — R74.8 ABNORMAL LEVELS OF OTHER SERUM ENZYMES: ICD-10-CM

## 2025-05-05 DIAGNOSIS — R94.31 ABNORMAL ELECTROCARDIOGRAM [ECG] [EKG]: ICD-10-CM

## 2025-05-05 PROCEDURE — 99214 OFFICE O/P EST MOD 30 MIN: CPT

## 2025-05-05 RX ORDER — CHLORTHALIDONE 25 MG/1
25 TABLET ORAL DAILY
Refills: 0 | Status: ACTIVE | COMMUNITY

## 2025-05-07 ENCOUNTER — APPOINTMENT (OUTPATIENT)
Dept: CARDIOLOGY | Facility: CLINIC | Age: 65
End: 2025-05-07
Payer: MEDICAID

## 2025-05-07 PROCEDURE — 93925 LOWER EXTREMITY STUDY: CPT

## 2025-05-12 ENCOUNTER — APPOINTMENT (OUTPATIENT)
Dept: CARDIOLOGY | Facility: CLINIC | Age: 65
End: 2025-05-12
Payer: MEDICAID

## 2025-05-12 VITALS
DIASTOLIC BLOOD PRESSURE: 70 MMHG | SYSTOLIC BLOOD PRESSURE: 124 MMHG | OXYGEN SATURATION: 97 % | WEIGHT: 172 LBS | BODY MASS INDEX: 22.08 KG/M2 | HEART RATE: 68 BPM

## 2025-05-12 DIAGNOSIS — R60.0 LOCALIZED EDEMA: ICD-10-CM

## 2025-05-12 DIAGNOSIS — I65.29 OCCLUSION AND STENOSIS OF UNSPECIFIED CAROTID ARTERY: ICD-10-CM

## 2025-05-12 DIAGNOSIS — I87.2 VENOUS INSUFFICIENCY (CHRONIC) (PERIPHERAL): ICD-10-CM

## 2025-05-12 DIAGNOSIS — I10 ESSENTIAL (PRIMARY) HYPERTENSION: ICD-10-CM

## 2025-05-12 PROCEDURE — 99204 OFFICE O/P NEW MOD 45 MIN: CPT
